# Patient Record
(demographics unavailable — no encounter records)

---

## 2024-12-12 NOTE — ASSESSMENT
[FreeTextEntry1] : The patient is a 21 year old F who presents to the office for an annual wellness examination - Fasting labs to screen for anemia, electrolyte disturbances, DM, lipid disorders, and additional metabolic disorders - PHQ2 performed: 0 points - Encouraged routine dental, vision, dermatology screenings & age-appropriate physical activity - Pap smear: discussed purpose of pap smear. Does not need pap smear now. Can start at 25 unless she becomes sexually active     Where applicable: - Labs drawn in office. - Appropriate medication renewal(s) provided. - Provided scripts for necessary imaging and/or referrals.     Further management to be completed pending lab results and/or imaging studies. All of the patient's questions and concerns were answered in detail.

## 2024-12-12 NOTE — HISTORY OF PRESENT ILLNESS
[Other: _____] : [unfilled] [FreeTextEntry1] : AWV//establish care with new provider [de-identified] : The patient is a 21-year-old F who presents to the office for an annual wellness examination and follow up of chronic medical conditions. She reports compliance with all prescribed medical therapy and dietary restrictions.  Currently in school for [employer]. Finals upcoming  Routine Health maintenance (as applicable) LMP: 4 days ago. Has been regular. Heavy cramps for the first 2 days. Pap Smear: pt has never been sexually active. no h/o pap smears     COVID vaccine series: Flu: now due Tdap (19-64): unknown

## 2024-12-12 NOTE — HEALTH RISK ASSESSMENT
[Good] : ~his/her~  mood as  good [2 - 4 times a month (2 pts)] : 2-4 times a month (2 points) [1 or 2 (0 pts)] : 1 or 2 (0 points) [Never (0 pts)] : Never (0 points) [No] : In the past 12 months have you used drugs other than those required for medical reasons? No [No falls in past year] : Patient reported no falls in the past year [0] : 2) Feeling down, depressed, or hopeless: Not at all (0) [HIV test declined] : HIV test declined [Hepatitis C test declined] : Hepatitis C test declined [With Family] : lives with family [Employed] : employed [Student] : student [Single] : single [Smoke Detector] : smoke detector [Carbon Monoxide Detector] : carbon monoxide detector [Seat Belt] :  uses seat belt [Sunscreen] : uses sunscreen [PHQ-2 Negative - No further assessment needed] : PHQ-2 Negative - No further assessment needed [Never] : Never [de-identified] : No [de-identified] : No [Audit-CScore] : 1 [de-identified] : Active at work  [de-identified] : Regular  [[id number]] : 0 [Reports changes in hearing] : Reports no changes in hearing [Reports changes in vision] : Reports no changes in vision [Reports changes in dental health] : Reports no changes in dental health [PapSmearComments] : none. Not sexually active  [de-identified] : No

## 2024-12-12 NOTE — HISTORY OF PRESENT ILLNESS
[Other: _____] : [unfilled] [FreeTextEntry1] : AWV//establish care with new provider [de-identified] : The patient is a 21-year-old F who presents to the office for an annual wellness examination and follow up of chronic medical conditions. She reports compliance with all prescribed medical therapy and dietary restrictions.  Currently in school for [employer]. Finals upcoming  Routine Health maintenance (as applicable) LMP: 4 days ago. Has been regular. Heavy cramps for the first 2 days. Pap Smear: pt has never been sexually active. no h/o pap smears     COVID vaccine series: Flu: now due Tdap (19-64): unknown

## 2024-12-12 NOTE — HEALTH RISK ASSESSMENT
[Good] : ~his/her~  mood as  good [2 - 4 times a month (2 pts)] : 2-4 times a month (2 points) [1 or 2 (0 pts)] : 1 or 2 (0 points) [Never (0 pts)] : Never (0 points) [No] : In the past 12 months have you used drugs other than those required for medical reasons? No [No falls in past year] : Patient reported no falls in the past year [0] : 2) Feeling down, depressed, or hopeless: Not at all (0) [HIV test declined] : HIV test declined [Hepatitis C test declined] : Hepatitis C test declined [With Family] : lives with family [Employed] : employed [Student] : student [Single] : single [Smoke Detector] : smoke detector [Carbon Monoxide Detector] : carbon monoxide detector [Seat Belt] :  uses seat belt [Sunscreen] : uses sunscreen [PHQ-2 Negative - No further assessment needed] : PHQ-2 Negative - No further assessment needed [Never] : Never [de-identified] : No [de-identified] : No [Audit-CScore] : 1 [de-identified] : Active at work  [de-identified] : Regular  [[id number]] : 0 [Reports changes in hearing] : Reports no changes in hearing [Reports changes in vision] : Reports no changes in vision [Reports changes in dental health] : Reports no changes in dental health [PapSmearComments] : none. Not sexually active  [de-identified] : No